# Patient Record
Sex: MALE | Race: OTHER | HISPANIC OR LATINO | URBAN - METROPOLITAN AREA
[De-identification: names, ages, dates, MRNs, and addresses within clinical notes are randomized per-mention and may not be internally consistent; named-entity substitution may affect disease eponyms.]

---

## 2017-01-25 ENCOUNTER — EMERGENCY (EMERGENCY)
Facility: HOSPITAL | Age: 22
LOS: 1 days | Discharge: HOME | End: 2017-01-25
Attending: PEDIATRICS | Admitting: PEDIATRICS
Payer: MEDICAID

## 2017-01-25 VITALS
OXYGEN SATURATION: 99 % | SYSTOLIC BLOOD PRESSURE: 144 MMHG | TEMPERATURE: 98 F | WEIGHT: 126.99 LBS | DIASTOLIC BLOOD PRESSURE: 84 MMHG | HEART RATE: 92 BPM | RESPIRATION RATE: 16 BRPM

## 2017-01-25 VITALS — WEIGHT: 279.11 LBS

## 2017-01-25 DIAGNOSIS — R10.32 LEFT LOWER QUADRANT PAIN: ICD-10-CM

## 2017-01-25 LAB
ALBUMIN SERPL ELPH-MCNC: 4.7 G/DL — SIGNIFICANT CHANGE UP (ref 3.3–5)
ALP SERPL-CCNC: 64 U/L — SIGNIFICANT CHANGE UP (ref 40–120)
ALT FLD-CCNC: 115 U/L RC — HIGH (ref 10–45)
ANION GAP SERPL CALC-SCNC: 10 MMOL/L — SIGNIFICANT CHANGE UP (ref 5–17)
APPEARANCE UR: CLEAR — SIGNIFICANT CHANGE UP
AST SERPL-CCNC: 53 U/L — HIGH (ref 10–40)
BACTERIA # UR AUTO: ABNORMAL /HPF
BILIRUB SERPL-MCNC: 0.3 MG/DL — SIGNIFICANT CHANGE UP (ref 0.2–1.2)
BILIRUB UR-MCNC: NEGATIVE — SIGNIFICANT CHANGE UP
BUN SERPL-MCNC: 15 MG/DL — SIGNIFICANT CHANGE UP (ref 7–23)
CALCIUM SERPL-MCNC: 9.8 MG/DL — SIGNIFICANT CHANGE UP (ref 8.4–10.5)
CHLORIDE SERPL-SCNC: 105 MMOL/L — SIGNIFICANT CHANGE UP (ref 96–108)
CO2 SERPL-SCNC: 27 MMOL/L — SIGNIFICANT CHANGE UP (ref 22–31)
COLOR SPEC: YELLOW — SIGNIFICANT CHANGE UP
CREAT SERPL-MCNC: 0.76 MG/DL — SIGNIFICANT CHANGE UP (ref 0.5–1.3)
DIFF PNL FLD: NEGATIVE — SIGNIFICANT CHANGE UP
EPI CELLS # UR: SIGNIFICANT CHANGE UP /HPF
GLUCOSE SERPL-MCNC: 137 MG/DL — HIGH (ref 70–99)
GLUCOSE UR QL: NEGATIVE — SIGNIFICANT CHANGE UP
HCT VFR BLD CALC: 44.5 % — SIGNIFICANT CHANGE UP (ref 39–50)
HGB BLD-MCNC: 15.1 G/DL — SIGNIFICANT CHANGE UP (ref 13–17)
KETONES UR-MCNC: NEGATIVE — SIGNIFICANT CHANGE UP
LEUKOCYTE ESTERASE UR-ACNC: NEGATIVE — SIGNIFICANT CHANGE UP
LIDOCAIN IGE QN: 27 U/L — SIGNIFICANT CHANGE UP (ref 7–60)
MCHC RBC-ENTMCNC: 31.2 PG — SIGNIFICANT CHANGE UP (ref 27–34)
MCHC RBC-ENTMCNC: 34 GM/DL — SIGNIFICANT CHANGE UP (ref 32–36)
MCV RBC AUTO: 91.7 FL — SIGNIFICANT CHANGE UP (ref 80–100)
NITRITE UR-MCNC: NEGATIVE — SIGNIFICANT CHANGE UP
PH UR: 7 — SIGNIFICANT CHANGE UP (ref 4.8–8)
PLATELET # BLD AUTO: 239 K/UL — SIGNIFICANT CHANGE UP (ref 150–400)
POTASSIUM SERPL-MCNC: 4.4 MMOL/L — SIGNIFICANT CHANGE UP (ref 3.5–5.3)
POTASSIUM SERPL-SCNC: 4.4 MMOL/L — SIGNIFICANT CHANGE UP (ref 3.5–5.3)
PROT SERPL-MCNC: 8.2 G/DL — SIGNIFICANT CHANGE UP (ref 6–8.3)
PROT UR-MCNC: SIGNIFICANT CHANGE UP
RBC # BLD: 4.85 M/UL — SIGNIFICANT CHANGE UP (ref 4.2–5.8)
RBC # FLD: 12.5 % — SIGNIFICANT CHANGE UP (ref 10.3–14.5)
RBC CASTS # UR COMP ASSIST: SIGNIFICANT CHANGE UP /HPF (ref 0–2)
SODIUM SERPL-SCNC: 142 MMOL/L — SIGNIFICANT CHANGE UP (ref 135–145)
SP GR SPEC: 1.03 — HIGH (ref 1.01–1.02)
UROBILINOGEN FLD QL: NEGATIVE — SIGNIFICANT CHANGE UP
WBC # BLD: 5.1 K/UL — SIGNIFICANT CHANGE UP (ref 3.8–10.5)
WBC # FLD AUTO: 5.1 K/UL — SIGNIFICANT CHANGE UP (ref 3.8–10.5)
WBC UR QL: SIGNIFICANT CHANGE UP /HPF (ref 0–5)

## 2017-01-25 PROCEDURE — 80053 COMPREHEN METABOLIC PANEL: CPT

## 2017-01-25 PROCEDURE — 83690 ASSAY OF LIPASE: CPT

## 2017-01-25 PROCEDURE — 99285 EMERGENCY DEPT VISIT HI MDM: CPT

## 2017-01-25 PROCEDURE — 99284 EMERGENCY DEPT VISIT MOD MDM: CPT | Mod: 25

## 2017-01-25 PROCEDURE — 87086 URINE CULTURE/COLONY COUNT: CPT

## 2017-01-25 PROCEDURE — 74177 CT ABD & PELVIS W/CONTRAST: CPT | Mod: 26

## 2017-01-25 PROCEDURE — 74177 CT ABD & PELVIS W/CONTRAST: CPT

## 2017-01-25 PROCEDURE — 85027 COMPLETE CBC AUTOMATED: CPT

## 2017-01-25 PROCEDURE — 81001 URINALYSIS AUTO W/SCOPE: CPT

## 2017-01-25 RX ORDER — IBUPROFEN 200 MG
600 TABLET ORAL ONCE
Qty: 0 | Refills: 0 | Status: COMPLETED | OUTPATIENT
Start: 2017-01-25 | End: 2017-01-25

## 2017-01-25 RX ADMIN — Medication 600 MILLIGRAM(S): at 13:50

## 2017-01-25 NOTE — ED PROVIDER NOTE - MEDICAL DECISION MAKING DETAILS
Patient with mild LLQ abdominal pain, no n/v/d, no s/s of systemic infection. Ddx muscle strain, hernia possible. Atypical appendicitis possible but unlikely. Plan: pain control, reassess abdomen, ua AP 21y M with abd pain. Muscle strain vs hernia vs atypical appy. Labs, ct, reassess.

## 2017-01-25 NOTE — ED PROVIDER NOTE - OBJECTIVE STATEMENT
Patient is 21 y M with no PMH presenting with LLQ abdominal pain since monday.   ROS: Denies fever, palpitations, chills, recent sickness, HA, vision changes, cough, SOB, chest pain, n/v/d/c, dysuria, hematuria, rash, new joint aches, sick contacts, and recent travel. Patient is 21 y M with no PMH presenting with gradual onset 5/10 dull constant LLQ abdominal pain since monday. Sx occurred about 15-30 minutes after lifting a 10 lb bag. Did not take any pain medicines. Has not had this before.  ROS: Denies fever, palpitations, chills, recent sickness, HA, vision changes, cough, SOB, chest pain, n/v/d/c, dysuria, hematuria, rash, new joint aches, sick contacts, and recent travel. Patient is 21 y M with no PMH presenting with gradual onset 5/10 dull constant LLQ abdominal pain for 3 days. Sx occurred about 15-30 minutes after lifting a 10 lb bag. Did not take any pain medicines. Has not had this before.  ROS: Denies fever, palpitations, chills, recent sickness, HA, vision changes, cough, SOB, chest pain, n/v/d/c, dysuria, hematuria, rash, new joint aches, sick contacts, and recent travel.

## 2017-01-25 NOTE — ED ADULT NURSE NOTE - OBJECTIVE STATEMENT
This 21 male in ED with c/o mid-lower abdominal pain onset Monday. Pt denies nausea, vomiting, diarrhea, fever or urinary symptoms. Pt states pain is non-radiating,   has been eating and drinking normally, had last BM this morning with no changes noted. Pt is eating and drinking normally. On Exam abdomen obese, pos bowel sound, pos mid abdominal tenderness.

## 2017-01-25 NOTE — ED PROVIDER NOTE - PROGRESS NOTE DETAILS
Patient still comfortable with minimal pain at rest, not requiring pain meds at this time. Explained test and CT results to patient, recommended getting liver enzymes rechecked with PMD. repeat abdominal exam benign. ok to d/c home.

## 2017-01-25 NOTE — ED PROVIDER NOTE - PHYSICAL EXAMINATION
Gen: NAD, AOx3  Head: NCAT  HEENT: PERRL, oral mucosa moist, normal conjunctiva  Lung: CTAB, no respiratory distress  CV: rrr, no murmurs, Normal perfusion  Abd: soft, minimal tenderness LLQ no guarding no rebound, no CVA tenderness  : no hernias, no testicular tenderness  MSK: No edema, no visible deformities  Neuro: No focal neurologic deficits  Skin: No rash   Psych: normal affect

## 2017-01-25 NOTE — ED PROVIDER NOTE - ATTENDING CONTRIBUTION TO CARE
21y M obese here with L sided abd pain x 2 days, no fever or vomting. Normal BM today. Lifted a bag prior to onset, appx 10 min before. No pain meds. No urinary complaints.  Vital Signs Stable  Gen: well appearing, NAD  HEENT: no conjunctivitis, MMM  Neck supple  Cardiac: regular rate rhythm, normal S1S2  Chest: CTA BL, no wheeze or crackles  Abdomen: normal BS, soft, LLQ tenderness, cannot assess for abd hernia due to body habitus  Normal , no  hernia palpated  Extremity: no gross deformity, good perfusion  Skin: no rash  Neuro: grossly normal     AP 21y M with abd pain. Muscle strain vs hernia vs atypical appy. Labs, ct, reassess.

## 2017-01-26 LAB
CULTURE RESULTS: NO GROWTH — SIGNIFICANT CHANGE UP
SPECIMEN SOURCE: SIGNIFICANT CHANGE UP

## 2018-04-01 ENCOUNTER — OUTPATIENT (OUTPATIENT)
Dept: OUTPATIENT SERVICES | Facility: HOSPITAL | Age: 23
LOS: 1 days | End: 2018-04-01

## 2018-04-09 ENCOUNTER — EMERGENCY (EMERGENCY)
Facility: HOSPITAL | Age: 23
LOS: 1 days | Discharge: HOME | End: 2018-04-09
Attending: EMERGENCY MEDICINE | Admitting: EMERGENCY MEDICINE
Payer: MEDICAID

## 2018-04-09 VITALS
HEART RATE: 86 BPM | RESPIRATION RATE: 17 BRPM | DIASTOLIC BLOOD PRESSURE: 85 MMHG | SYSTOLIC BLOOD PRESSURE: 146 MMHG | OXYGEN SATURATION: 100 % | TEMPERATURE: 209 F

## 2018-04-09 PROCEDURE — 99283 EMERGENCY DEPT VISIT LOW MDM: CPT

## 2018-04-09 PROCEDURE — 99282 EMERGENCY DEPT VISIT SF MDM: CPT

## 2018-04-09 RX ORDER — IBUPROFEN 200 MG
400 TABLET ORAL ONCE
Qty: 0 | Refills: 0 | Status: COMPLETED | OUTPATIENT
Start: 2018-04-09 | End: 2018-04-09

## 2018-04-09 RX ADMIN — Medication 400 MILLIGRAM(S): at 12:34

## 2018-04-09 RX ADMIN — Medication 400 MILLIGRAM(S): at 13:34

## 2018-04-09 NOTE — ED PROVIDER NOTE - ATTENDING CONTRIBUTION TO CARE
Marisela Goncalves MD - Attending Physician: I have personally seen and examined this patient with the resident/fellow.  I have fully participated in the care of this patient. I have reviewed all pertinent clinical information, including history, physical exam, plan and the Resident/Fellow’s note and agree except as noted. See MDM

## 2018-04-09 NOTE — ED PROVIDER NOTE - OBJECTIVE STATEMENT
22M with no PMHx p/w LLQ pain x1 week. Patient states he ate peppers last week and since has had LLQ pain. Pain is intermittent and sharp. Feels better after passing flatus and moving his bowels. Patient reports constipation for the past 3 days. tried taking mag citrate and had loose stool and some relief of pain. denies fever, chills, nausea, vomiting, testicular pain, dysuria, penile discharge, back pain. 22M with no PMHx p/w LLQ pain x1 week. Patient states he ate peppers last week and since has had LLQ pain. Pain is intermittent and sharp. Feels better after passing flatus and moving his bowels. Patient reports constipation for the past 3 days. tried taking mag citrate and had loose stool and some relief of pain. denies fever, chills, nausea, vomiting, testicular pain, dysuria, penile discharge, back pain.    PMD: Dr. Nguyen

## 2018-04-11 DIAGNOSIS — R69 ILLNESS, UNSPECIFIED: ICD-10-CM

## 2018-10-01 ENCOUNTER — OUTPATIENT (OUTPATIENT)
Dept: OUTPATIENT SERVICES | Facility: HOSPITAL | Age: 23
LOS: 1 days | End: 2018-10-01
Payer: MEDICAID

## 2018-10-01 PROCEDURE — G9001: CPT

## 2018-10-09 DIAGNOSIS — Z71.89 OTHER SPECIFIED COUNSELING: ICD-10-CM

## 2019-05-23 NOTE — ED PROVIDER NOTE - MEDICAL DECISION MAKING DETAILS
22M with constipation and abdominal pain. motrin for relief, adequate fruits and vegetable intake, PMD f/u as outpt. no 22M with constipation and abdominal pain. motrin for relief, adequate fruits and vegetable intake, PMD f/u as outpt.    Marisela Goncalves MD - Attending Physician: Pt here with left lower quadrant pain x 1 week, not reproducible on exam, improved with mag citrate and aleve. Well appearing, nontender. No acute life threatening concerns, no associated symptoms. Tolerating po. Supportive care, f/u with pcp

## 2020-03-29 ENCOUNTER — EMERGENCY (EMERGENCY)
Facility: HOSPITAL | Age: 25
LOS: 1 days | Discharge: HOME | End: 2020-03-29
Attending: EMERGENCY MEDICINE
Payer: COMMERCIAL

## 2020-03-29 VITALS
HEART RATE: 94 BPM | DIASTOLIC BLOOD PRESSURE: 92 MMHG | SYSTOLIC BLOOD PRESSURE: 177 MMHG | WEIGHT: 300.05 LBS | OXYGEN SATURATION: 98 % | TEMPERATURE: 98 F | RESPIRATION RATE: 18 BRPM | HEIGHT: 70 IN

## 2020-03-29 PROCEDURE — 99284 EMERGENCY DEPT VISIT MOD MDM: CPT

## 2020-03-29 PROCEDURE — 99282 EMERGENCY DEPT VISIT SF MDM: CPT

## 2020-03-29 RX ORDER — ACETAMINOPHEN 500 MG
650 TABLET ORAL ONCE
Refills: 0 | Status: COMPLETED | OUTPATIENT
Start: 2020-03-29 | End: 2020-03-29

## 2020-03-29 RX ADMIN — Medication 650 MILLIGRAM(S): at 14:01

## 2020-03-29 NOTE — ED PROVIDER NOTE - PHYSICAL EXAMINATION
I have reviewed the triage vital signs.  Const: AAOx3, in NAD  Eyes: no conjunctival injection  HENT: NCAT, Neck supple, cervical ROM WNL, oral mucosa moist  CV: RRR, +S1, S2  Resp: CTAB, no respiratory distress  GI: Abdomen soft, NTND, no guarding, no CVA tenderness  Extremities: No peripheral edema,  2+ radial and DP pulses  Skin: Warm, well perfused, no rash  MSK: No gross deformities appreciated  Neuro: CN2-12 grossly intact, +5/5 in UE and LE BL, gross sensation intact in UE and LE BL, gait smooth and coordinated  Psych: Appropriate mood and affect

## 2020-03-29 NOTE — ED PROVIDER NOTE - CLINICAL SUMMARY MEDICAL DECISION MAKING FREE TEXT BOX
Bebeto, PGY1 - 24M no PMH p/w HA, mild cough, loss of taste/smell x 2wk. Neuro exam nonfocal. No meningismus. CTAB, no respiratory distress, O2 Sat 98% on RA. Suspicion for COVID vs. viral illness. Plan: CXR, pain control, reeval.

## 2020-03-29 NOTE — ED PROVIDER NOTE - ATTENDING CONTRIBUTION TO CARE
Patient presenting with symptoms concerning for possible COVID-19, non toxic appearing, satting well on room air, not tachypneic, will discharge.

## 2020-03-29 NOTE — ED PROVIDER NOTE - PATIENT PORTAL LINK FT
You can access the FollowMyHealth Patient Portal offered by [facility] by registering at the following website: [email or web address]. By joining [facility]’s FollowMyHealth portal, you will also be able to view your health information using other applications (apps) compatible with our system.

## 2020-03-29 NOTE — ED PROVIDER NOTE - CARE PLAN
Principal Discharge DX:	Suspected [facility] coronavirus infection  Secondary Diagnosis:	Headache

## 2020-03-29 NOTE — ED PROVIDER NOTE - NSFOLLOWUPINSTRUCTIONS_ED_ALL_ED_FT
YOU WERE SEEN IN THE EMERGENCY DEPARTMENT FOR A LIKELY VIRAL ILLNESS. WE CANNOT PERFORM DEFINITIVE TESTING AT THIS TIME FOR THE NOVEL CORONAVIRUS DUE TO CURRENT TESTING PARAMETERS. PLEASE READ THE INFORMATION PACKET PROVIDED TO YOU CAREFULLY.     YOU SHOULD SELF-QUARANTINE FOR 14 DAYS TO AVOID POTENTIAL SPREAD OF THE CORONAVIRUS.       Return to the ED for difficulty breathing.    You may over the counter acetaminophen (Tylenol) 650mg every 6 hours as needed for fever or pain. There is some concern in the medical community about using ibuprofen (Advil, Motrin) and other NSAIDs in people with COVID-19 infections and until there is more research on this subject we don't know if the medications may play a role in any pathology. At this time based on the scientific evidence available you may take 600mg of ibuprofen every six hours with food, milk, or maalox to prevent gastric/stomach upset and burning sensation. Please know that this should be second line to controlling pain and fever for your symptoms after Acetaminophen/Tylenol. If you have further questions please call back our call back line or call your primary medical provider for more infromation on NSAID use and COVID-19 infections.  Please do not take these medications if you do not have pain or fever or if you have any history of liver disease.     -------------    What is a coronavirus?  Coronaviruses are a large family of viruses that cause illnesses ranging from the common cold to more severe diseases such as Middle East Respiratory Syndrome (MERS) and Severe Acute Respiratory Syndrome (SARS).    What is Novel Coronavirus (COVID-19)?  The Centers for Disease Control and Prevention (CDC) is closely monitoring the outbreak caused by COVID-19. For the latest information about COVID-19, visit the CDC website at CDC.gov/Coronavirus    How are coronaviruses spread?  Coronaviruses can be transmitted from person to person, usually after close contact with an infected  person (for example, in a household, workplace, or healthcare setting), via droplets that become airborne after a cough or sneeze. These droplets can then infect a nearby person. Transmission can also occur by touching recently contaminated surfaces.    Is there a treatment for a COVID-19?  There is no specific treatment for disease caused by COVID-19. However, many of the symptoms can be treated based on the patient’s clinical condition. Supportive care for infected persons can be highly effective.    What are the symptoms of coronavirus infection?  It depends on the virus, but common signs include fever and/or respiratory symptoms such as cough and shortness of breath. In more severe cases, infection can cause pneumonia, severe acute respiratory syndrome, kidney failure and even death. Fortunately, most cases of COVID-19 have an illness no different than the influenza (flu), with a majority of these patients having mild symptoms and overall mortality which appears to be not much different than the flu.    What can I do to protect myself?  The best precautionary measures:  – washing your hands  – covering your cough  – disinfecting surfaces  – it is also advisable to avoid close contact with anyone showing symptoms of respiratory illness such as coughing and sneezing  – those with symptoms should wear a surgical mask when around others    What can I do to protect those around me?  If you have been identified as someone who may be infected with COVID-19, we recommend you follow the self-isolation procedures outlined on the following page to protect those around you and to limit the spread of this virus.    We recommend the below precautionary steps from now until 14 days from when you returned from your travel or date of your last known possible contact:    — Do not go to work, school or public areas. Avoid using public transportation, ridesharing or taxis.  — As much as possible, separate yourself from other people in your home. If you can, you should stay in a room and away from other people. Also, you should use a separate bathroom if available.  — Wear the supplied mask whenever you are around other people.  — If you have a non-urgent medical appointment, please reschedule for a later date. If the appointment is urgent, please call the health care provider and tell them that you are on self-isolation for possible COVID-19. This will help the health care provider’s office take steps to keep other people from getting infected or exposed. If you can reschedule routine appointments, do so.  — Wash your hands often with soap and water for at least 15 to 20 seconds or clean your hands with an alcohol-based hand  that contains 60 to 95% alcohol, covering all surfaces of your hands and rubbing them together until they feel dry. Soap and water should be used preferentially if hands are visibly dirty.  — Cover your mouth and nose with a tissue when you cough or sneeze. Throw used tissues in a lined trash can. Immediately wash your hands.  — Avoid touching your eyes, nose, and mouth with your hands.  — Avoid sharing personal household items. You should not share dishes, drinking glasses, cups, eating utensils, towels, or bedding with other people or pets in your home. After using these items, they should be washed thoroughly with soap and water.  — Clean and disinfect all “high-touch” surfaces every day. High touch surfaces include counters, tabletops, doorknobs, light switches, remote controls, bathroom fixtures, toilets, phones, keyboards, tablets, and bedside tables. Also, clean any surfaces that may have blood, stool, or body fluids on them.

## 2020-03-29 NOTE — ED PROVIDER NOTE - NS ED ROS FT
General: Chills. Denies fever  Eyes: Denies vision changes  ENMT: Nasal congestion. Loss of taste and smell. Denies trouble swallowing or speaking, or sore throat  CV: Denies chest pain or palpitations  Resp: Mild cough. No SOB  GI: Denies abdominal pain, nausea, vomiting, diarrhea, or constipation   : Denies painful urination, increased urinary frequency, or blood in urine  Skin: Denies new rashes  Neuro: Headache. No numbness, or weakness  MSK: Denies recent trauma

## 2020-03-29 NOTE — ED PROVIDER NOTE - OBJECTIVE STATEMENT
24M no PMH p/w chills, headache, mild cough, nasal congestion, loss of taste and smell x2wk. No CP, SOB. No visual changes, numbness, tingling, weakness. No known +COVID contacts.

## 2024-09-26 NOTE — ED ADULT NURSE NOTE - CHIEF COMPLAINT QUOTE
Spoke to patient to reschedule procedure(s) Colonoscopy       Physician to perform procedure(s) First available MD  Date of Procedure (s) 10/4/24  Arrival Time 8:00 AM  Time of Procedure(s) 9:00 AM   Location of Procedure(s) [address]   Type of Rx Prep sent to patient: Suprep  Instructions provided to patient via [facility]    Patient was informed on the following information and verbalized understanding. Screening questionnaire reviewed with patient and complete. If procedure requires anesthesia, a responsible adult needs to be present to accompany the patient home, patient cannot drive after receiving anesthesia. Appointment details are tentative, especially check-in time. Patient will receive a prep-op call 7 days prior to confirm check-in time for procedure. If applicable the patient should contact their pharmacy to verify Rx for procedure prep is ready for pick-up. Patient was advised to call the scheduling department at [phone] if pharmacy states no Rx is available. Patient was advised to call the endoscopy scheduling department if any questions or concerns arise.       Endoscopy Scheduling Department    Pt notified of the following:  Scoping physician will be determined day of Endoscopy procedure.   Arrival time may change (morning or afternoon adjustment) because Pt will be worked into the physician's scoping schedule. As a result, this may cause a delay in the procedure start time. Pt verbalized understanding.     
We can wait for Dr Galvan visit prior to MRI    Isaias Fountain MD    
Lt lower abd pain